# Patient Record
Sex: MALE | Race: WHITE | Employment: FULL TIME | URBAN - METROPOLITAN AREA
[De-identification: names, ages, dates, MRNs, and addresses within clinical notes are randomized per-mention and may not be internally consistent; named-entity substitution may affect disease eponyms.]

---

## 2019-06-21 ENCOUNTER — ANESTHESIA EVENT (OUTPATIENT)
Dept: ENDOSCOPY | Age: 54
End: 2019-06-21
Payer: COMMERCIAL

## 2019-06-24 ENCOUNTER — HOSPITAL ENCOUNTER (OUTPATIENT)
Age: 54
Setting detail: OUTPATIENT SURGERY
Discharge: HOME | End: 2019-06-24
Attending: SURGERY | Admitting: SURGERY
Payer: COMMERCIAL

## 2019-06-24 ENCOUNTER — ANESTHESIA (OUTPATIENT)
Dept: ENDOSCOPY | Age: 54
End: 2019-06-24
Payer: COMMERCIAL

## 2019-06-24 VITALS
HEIGHT: 70 IN | HEART RATE: 72 BPM | DIASTOLIC BLOOD PRESSURE: 90 MMHG | WEIGHT: 210 LBS | BODY MASS INDEX: 30.06 KG/M2 | RESPIRATION RATE: 16 BRPM | OXYGEN SATURATION: 98 % | SYSTOLIC BLOOD PRESSURE: 158 MMHG | TEMPERATURE: 98.6 F

## 2019-06-24 PROCEDURE — 74011250636 HC RX REV CODE- 250/636

## 2019-06-24 PROCEDURE — 74011250636 HC RX REV CODE- 250/636: Performed by: ANESTHESIOLOGY

## 2019-06-24 PROCEDURE — 76040000025: Performed by: SURGERY

## 2019-06-24 PROCEDURE — 76060000032 HC ANESTHESIA 0.5 TO 1 HR: Performed by: SURGERY

## 2019-06-24 RX ORDER — PROPOFOL 10 MG/ML
INJECTION, EMULSION INTRAVENOUS AS NEEDED
Status: DISCONTINUED | OUTPATIENT
Start: 2019-06-24 | End: 2019-06-24 | Stop reason: HOSPADM

## 2019-06-24 RX ORDER — PROPOFOL 10 MG/ML
INJECTION, EMULSION INTRAVENOUS
Status: DISCONTINUED | OUTPATIENT
Start: 2019-06-24 | End: 2019-06-24 | Stop reason: HOSPADM

## 2019-06-24 RX ORDER — SODIUM CHLORIDE, SODIUM LACTATE, POTASSIUM CHLORIDE, CALCIUM CHLORIDE 600; 310; 30; 20 MG/100ML; MG/100ML; MG/100ML; MG/100ML
100 INJECTION, SOLUTION INTRAVENOUS CONTINUOUS
Status: DISCONTINUED | OUTPATIENT
Start: 2019-06-24 | End: 2019-06-24 | Stop reason: HOSPADM

## 2019-06-24 RX ORDER — LIDOCAINE HYDROCHLORIDE 20 MG/ML
INJECTION, SOLUTION EPIDURAL; INFILTRATION; INTRACAUDAL; PERINEURAL AS NEEDED
Status: DISCONTINUED | OUTPATIENT
Start: 2019-06-24 | End: 2019-06-24 | Stop reason: HOSPADM

## 2019-06-24 RX ADMIN — PROPOFOL 300 MCG/KG/MIN: 10 INJECTION, EMULSION INTRAVENOUS at 08:45

## 2019-06-24 RX ADMIN — LIDOCAINE HYDROCHLORIDE 60 MG: 20 INJECTION, SOLUTION EPIDURAL; INFILTRATION; INTRACAUDAL; PERINEURAL at 08:44

## 2019-06-24 RX ADMIN — PROPOFOL 100 MG: 10 INJECTION, EMULSION INTRAVENOUS at 08:47

## 2019-06-24 RX ADMIN — SODIUM CHLORIDE, SODIUM LACTATE, POTASSIUM CHLORIDE, AND CALCIUM CHLORIDE: 600; 310; 30; 20 INJECTION, SOLUTION INTRAVENOUS at 08:37

## 2019-06-24 RX ADMIN — SODIUM CHLORIDE, SODIUM LACTATE, POTASSIUM CHLORIDE, AND CALCIUM CHLORIDE 100 ML/HR: 600; 310; 30; 20 INJECTION, SOLUTION INTRAVENOUS at 08:04

## 2019-06-24 RX ADMIN — PROPOFOL 100 MG: 10 INJECTION, EMULSION INTRAVENOUS at 08:44

## 2019-06-24 NOTE — ANESTHESIA PREPROCEDURE EVALUATION
Relevant Problems   No relevant active problems       Anesthetic History   No history of anesthetic complications            Review of Systems / Medical History  Patient summary reviewed and pertinent labs reviewed    Pulmonary  Within defined limits                 Neuro/Psych   Within defined limits           Cardiovascular    Hypertension: well controlled              Exercise tolerance: >4 METS     GI/Hepatic/Renal  Within defined limits              Endo/Other  Within defined limits           Other Findings              Physical Exam    Airway  Mallampati: II  TM Distance: 4 - 6 cm  Neck ROM: normal range of motion   Mouth opening: Normal     Cardiovascular  Regular rate and rhythm,  S1 and S2 normal,  no murmur, click, rub, or gallop             Dental         Pulmonary  Breath sounds clear to auscultation               Abdominal         Other Findings            Anesthetic Plan    ASA: 2  Anesthesia type: total IV anesthesia          Induction: Intravenous  Anesthetic plan and risks discussed with: Patient and Spouse

## 2019-06-24 NOTE — PROCEDURES
Procedure in Detail:  Informed consent was obtained for the procedure. The patient was placed in the left lateral decubitus position and sedation was induced by anesthesia. The scope was inserted into the rectum and advanced under direct vision to the cecum, which was identified by the appendiceal orifice. The quality of the colonic preparation was adequate with a small to moderate amount of thick sludge in the right colon. A careful inspection was made as the colonoscope was withdrawn, including a retroflexed view of the rectum; findings and interventions are described below. Appropriate photodocumentation was obtained. Findings:   ANUS: Anal exam reveals no masses or hemorrhoids, sphincter tone is normal.   RECTUM: Rectal exam reveals no masses or hemorrhoids. SIGMOID COLON: The mucosa is normal with good vascular pattern and without ulcers or polyps. A few shallow diverticula were noted. DESCENDING COLON: The mucosa is normal with good vascular pattern and without ulcers, diverticula, and polyps. SPLENIC FLEXURE: The splenic flexure is normal.   TRANSVERSE COLON: The mucosa is normal with good vascular pattern and without ulcers, diverticula, and polyps. HEPATIC FLEXURE: The hepatic flexure is normal.   ASCENDING COLON: The mucosa is normal with good vascular pattern and without ulcers, diverticula, and polyps. CECUM: The appendiceal orifice appears normal. The ileocecal valve appears normal.   TERMINAL ILEUM: The terminal ileum was not entered. Specimens: No specimens were collected. Complications: None; patient tolerated the procedure well. \    EBL - none    Recommendations:   - For colon cancer screening in this average-risk patient, colonoscopy may be repeated in 10 years.      Signed By: Sunshien Patricia MD                        June 24, 2019

## 2019-06-24 NOTE — DISCHARGE INSTRUCTIONS
Kosta Valdes M.D.  [phone]    Instructions following colonoscopy:    ACTIVITY:   Resume usual, basic activities around the house today.  You may be light-headed or sleepy from anesthesia, so be careful going up and down stairs.  Avoid driving, operating machinery, or signing documents for 24 hours. DIET:   No restriction. Please note, some people may have nausea or cramps after this procedure which can result in an upset stomach after eating.  Many people have loose stools or diarrhea immediately after colonoscopy. It is also not uncommon to not have a bowel movement for 2-3 days. PAIN:   Some cramping or gas pain is normal after colonoscopy. However, if you experience worsening pain over the course of the day, or pain with associated fever please call the office immediately      [address] IF:   You have a temperature higher than 101.5° Fahrenheit for more than 6 hours.  You have severe nausea or vomiting not relieved by medication; or diarrhea. If you take a blood thinning medicine resume it:    Otherwise, continue home medications as previously prescribed.

## 2019-06-24 NOTE — H&P
History and Physical      Patient: Tamia Koo    Physician: Nando Lynch MD    Referring Physician: Julia Couch MD    Chief Complaint: For colonoscopy    History of Present Illness: Pt presents for colonoscopy. Initial screening. History:  Past Medical History:   Diagnosis Date    ADD (attention deficit disorder)     H/O degenerative disc disease     Hypertension     Mixed hyperlipidemia  LOW RISK 1/6/2016     Past Surgical History:   Procedure Laterality Date    HX BACK SURGERY      back surgery    HX ORTHOPAEDIC      knee surgery       Social History     Socioeconomic History    Marital status:      Spouse name: Not on file    Number of children: Not on file    Years of education: Not on file    Highest education level: Not on file   Tobacco Use    Smoking status: Never Smoker    Smokeless tobacco: Current User    Tobacco comment: snuff   Substance and Sexual Activity    Alcohol use: Yes     Comment: occasional    Drug use: No    Sexual activity: Yes     Partners: Female      Family History   Problem Relation Age of Onset    Heart Disease Mother     Hypertension Mother     Cancer Father         LUNG    Hypertension Paternal Uncle     Cancer Maternal Grandmother     Stroke Maternal Grandmother     Cancer Maternal Grandfather         LUNG    Attention Deficit Disorder Daughter     Depression Daughter        Medications:   Prior to Admission medications    Medication Sig Start Date End Date Taking? Authorizing Provider   predniSONE (DELTASONE) 10 mg tablet Take 1 Tab by mouth daily (with breakfast). 10/11/15   Louise Lee,    oxyCODONE-acetaminophen (PERCOCET) 5-325 mg per tablet Take 1 Tab by mouth every four (4) hours as needed for Pain. Max Daily Amount: 6 Tabs. 10/11/15   Frank Grace DO   lisinopril (PRINIVIL, ZESTRIL) 5 mg tablet Take  by mouth daily.  Indications: HYPERTENSION    Provider, Historical   cyclobenzaprine (FLEXERIL) 10 mg tablet Take 1 Tab by mouth three (3) times daily as needed for Muscle Spasm(s). 10/8/15   Yg Blue, DO   HYDROcodone-acetaminophen (NORCO) 5-325 mg per tablet Take 1 Tab by mouth every four (4) hours as needed for Pain for up to 12 doses. Max Daily Amount: 6 Tabs. 10/8/15   Yg Blue, DO       Allergies: No Known Allergies    Physical Exam:     Vital Signs:   Visit Vitals  BP (!) 192/94   Pulse 73   Temp 98.2 °F (36.8 °C)   Resp 18   Ht 5' 10\" (1.778 m)   Wt 210 lb (95.3 kg)   SpO2 99%   BMI 30.13 kg/m²     . General: no distress      Heart: regular   Lungs: unlabored   Abdominal: soft   Neurological: Grossly normal        Findings/Diagnosis: Screening for colorectal cancer     Plan of Care/Planned Procedure: Colonoscopy, possible polypectomy. Pt/designee has reviewed the colonoscopy information sheet. Any questions have been discussed. They agree to proceed.       Signed:  Karlee Garcia MD   6/24/2019

## 2019-06-24 NOTE — ROUTINE PROCESS
Pt. Discharged to car by Fili Grant with wife. Vital signs stable. Able to tolerate PO fluids. Passing gas.  Seen by MD.

## 2019-06-24 NOTE — ANESTHESIA POSTPROCEDURE EVALUATION
Procedure(s):  COLONOSCOPY / BMI 32.    total IV anesthesia    Anesthesia Post Evaluation      Multimodal analgesia: multimodal analgesia used between 6 hours prior to anesthesia start to PACU discharge  Patient location during evaluation: bedside  Patient participation: complete - patient participated  Level of consciousness: awake and alert  Pain score: 3  Pain management: adequate  Airway patency: patent  Anesthetic complications: no  Cardiovascular status: acceptable and hemodynamically stable  Respiratory status: acceptable  Hydration status: acceptable  Post anesthesia nausea and vomiting:  none      Vitals Value Taken Time   /75 6/24/2019  9:24 AM   Temp 37 °C (98.6 °F) 6/24/2019  9:18 AM   Pulse 80 6/24/2019  9:33 AM   Resp 16 6/24/2019  9:24 AM   SpO2 99 % 6/24/2019  9:33 AM   Vitals shown include unvalidated device data.

## 2019-09-09 ENCOUNTER — HOSPITAL ENCOUNTER (OUTPATIENT)
Dept: OCCUPATIONAL MEDICINE | Age: 54
Discharge: HOME | End: 2019-09-09

## 2019-09-09 DIAGNOSIS — T14.90XA INJURY: ICD-10-CM

## 2024-06-18 ENCOUNTER — HOSPITAL ENCOUNTER (EMERGENCY)
Age: 59
Discharge: HOME | End: 2024-06-18
Attending: STUDENT IN AN ORGANIZED HEALTH CARE EDUCATION/TRAINING PROGRAM
Payer: COMMERCIAL

## 2024-06-18 VITALS
HEIGHT: 70 IN | DIASTOLIC BLOOD PRESSURE: 64 MMHG | OXYGEN SATURATION: 100 % | TEMPERATURE: 97.9 F | WEIGHT: 240 LBS | BODY MASS INDEX: 34.36 KG/M2 | SYSTOLIC BLOOD PRESSURE: 165 MMHG | HEART RATE: 71 BPM | RESPIRATION RATE: 15 BRPM

## 2024-06-18 DIAGNOSIS — M54.50 ACUTE EXACERBATION OF CHRONIC LOW BACK PAIN: Primary | ICD-10-CM

## 2024-06-18 DIAGNOSIS — G89.29 ACUTE EXACERBATION OF CHRONIC LOW BACK PAIN: Primary | ICD-10-CM

## 2024-06-18 PROCEDURE — 99284 EMERGENCY DEPT VISIT MOD MDM: CPT

## 2024-06-18 PROCEDURE — 96372 THER/PROPH/DIAG INJ SC/IM: CPT

## 2024-06-18 PROCEDURE — 6360000002 HC RX W HCPCS: Performed by: STUDENT IN AN ORGANIZED HEALTH CARE EDUCATION/TRAINING PROGRAM

## 2024-06-18 PROCEDURE — 6370000000 HC RX 637 (ALT 250 FOR IP): Performed by: STUDENT IN AN ORGANIZED HEALTH CARE EDUCATION/TRAINING PROGRAM

## 2024-06-18 RX ORDER — PREDNISONE 20 MG/1
40 TABLET ORAL ONCE
Status: COMPLETED | OUTPATIENT
Start: 2024-06-18 | End: 2024-06-18

## 2024-06-18 RX ORDER — KETOROLAC TROMETHAMINE 15 MG/ML
15 INJECTION, SOLUTION INTRAMUSCULAR; INTRAVENOUS ONCE
Status: COMPLETED | OUTPATIENT
Start: 2024-06-18 | End: 2024-06-18

## 2024-06-18 RX ORDER — LIDOCAINE 4 G/G
1 PATCH TOPICAL ONCE
Status: DISCONTINUED | OUTPATIENT
Start: 2024-06-18 | End: 2024-06-18 | Stop reason: HOSPADM

## 2024-06-18 RX ORDER — LIDOCAINE 50 MG/G
1 PATCH TOPICAL DAILY
Qty: 10 PATCH | Refills: 0 | Status: SHIPPED | OUTPATIENT
Start: 2024-06-18 | End: 2024-06-28

## 2024-06-18 RX ORDER — METHYLPREDNISOLONE 4 MG/1
TABLET ORAL
Qty: 1 KIT | Refills: 0 | Status: SHIPPED | OUTPATIENT
Start: 2024-06-18 | End: 2024-06-24

## 2024-06-18 RX ORDER — METHOCARBAMOL 750 MG/1
750 TABLET, FILM COATED ORAL 3 TIMES DAILY
Qty: 21 TABLET | Refills: 0 | Status: SHIPPED | OUTPATIENT
Start: 2024-06-18 | End: 2024-06-25

## 2024-06-18 RX ORDER — MELOXICAM 15 MG/1
15 TABLET ORAL DAILY
Qty: 30 TABLET | Refills: 0 | Status: SHIPPED | OUTPATIENT
Start: 2024-06-18

## 2024-06-18 RX ORDER — CYCLOBENZAPRINE HCL 10 MG
10 TABLET ORAL
Status: COMPLETED | OUTPATIENT
Start: 2024-06-18 | End: 2024-06-18

## 2024-06-18 RX ADMIN — PREDNISONE 40 MG: 20 TABLET ORAL at 06:24

## 2024-06-18 RX ADMIN — CYCLOBENZAPRINE 10 MG: 10 TABLET, FILM COATED ORAL at 06:15

## 2024-06-18 RX ADMIN — KETOROLAC TROMETHAMINE 15 MG: 15 INJECTION, SOLUTION INTRAMUSCULAR; INTRAVENOUS at 06:15

## 2024-06-18 ASSESSMENT — PAIN - FUNCTIONAL ASSESSMENT: PAIN_FUNCTIONAL_ASSESSMENT: 0-10

## 2024-06-18 ASSESSMENT — PAIN SCALES - GENERAL
PAINLEVEL_OUTOF10: 8
PAINLEVEL_OUTOF10: 8

## 2024-06-18 NOTE — ED NOTES
Patient mobility status  with no difficulty. Provider aware     I have reviewed discharge instructions with the patient and spouse.  The patient and spouse verbalized understanding.    Patient left ED via Discharge Method: ambulatory to Home with Significant Other.    Opportunity for questions and clarification provided.     Patient given 4 scripts.

## 2024-06-18 NOTE — DISCHARGE INSTRUCTIONS
Apply alternating ice and heat to the low back for 20 minutes at a time every few hours. You may take Tylenol in addition to the prescribed medication. Do not drive while taking the muscle relaxer as it can make you sleepy. Please follow up with the Spine Surgery clinic in the next few weeks. Give their office a call to set up an appointment. Return to the ER if any new or worsening symptoms or if you develop any numbness in your groin or any issues controlling your bowel or bladders.

## 2024-06-18 NOTE — ED PROVIDER NOTES
[facility]  Emergency Department    DISPOSITION Decision To Discharge 06/18/2024 05:48:49 AM       ICD-10-CM    1. Acute exacerbation of chronic low back pain  M54.50 [facility] (Spine Surgery)    G89.29         ED Course     ED Course as of 06/18/24 0639   Tue Jun 18, 2024   0547 59-year-old male history of HTN, degenerative disc disease presents with acute on chronic pain to low back.  No trauma.  Hypertensive (suspect pain related) otherwise vitals reassuring.  No red flag symptoms.  Pain is reproducible on exam.  Suspect likely MSK strain.  Low concern for cauda equina or spinal epidural abscess.  No indication for emergent imaging at this time will start on symptomatic management.  Given referral to spine surgery.  Return precautions given [ER]      ED Course User Index  [ER] Marcus Escobar MD     Data Reviewed and Analyzed:  1 chronic illness with exacerbation.  Prescription drug management performed.    I independently ordered and reviewed each unique test.        HPI   Og García is a 59 y.o. male with a history of HTN, degenerative disc disease who presents to the ED with complaint of back pain.  Reports a multiweek history of persistent progressively worsening pain in his low back.  States over the past few weeks has had increase of symptoms including sharp pain in midline of back that radiates to both buttock regions.  No radiation down legs.  Denies numbness, tingling, weakness, saddle anesthesia, urinary/bowel incontinence, urinary retention, fevers.  No injury or trauma.  Pain is made worse with rotation of trunk and with lateral movement.  Has had similar in the past when he was diagnosed with degenerative disc disease.  Has undergone steroid injections in the past which she states has not helped.  He has attempted physical therapy in the past as well which is also not helped.  Is requesting referral for spine surgery.  Denies

## 2024-07-08 ENCOUNTER — OFFICE VISIT (OUTPATIENT)
Age: 59
End: 2024-07-08
Payer: COMMERCIAL

## 2024-07-08 VITALS — HEIGHT: 70 IN | WEIGHT: 234.4 LBS | BODY MASS INDEX: 33.56 KG/M2

## 2024-07-08 DIAGNOSIS — M51.36 LUMBAR DEGENERATIVE DISC DISEASE: ICD-10-CM

## 2024-07-08 DIAGNOSIS — M47.816 LUMBAR FACET ARTHROPATHY: ICD-10-CM

## 2024-07-08 DIAGNOSIS — M54.16 LUMBAR RADICULOPATHY: ICD-10-CM

## 2024-07-08 DIAGNOSIS — M54.9 BACK PAIN, UNSPECIFIED BACK LOCATION, UNSPECIFIED BACK PAIN LATERALITY, UNSPECIFIED CHRONICITY: Primary | ICD-10-CM

## 2024-07-08 PROCEDURE — 99204 OFFICE O/P NEW MOD 45 MIN: CPT | Performed by: NURSE PRACTITIONER

## 2024-07-08 RX ORDER — TIZANIDINE 4 MG/1
4 TABLET ORAL 3 TIMES DAILY PRN
Qty: 30 TABLET | Refills: 0 | Status: SHIPPED | OUTPATIENT
Start: 2024-07-08

## 2024-07-08 RX ORDER — LIDOCAINE 50 MG/G
1 PATCH TOPICAL DAILY
Qty: 10 PATCH | Refills: 0 | Status: SHIPPED | OUTPATIENT
Start: 2024-07-08 | End: 2024-07-18

## 2024-07-08 RX ORDER — CELECOXIB 200 MG/1
200 CAPSULE ORAL 2 TIMES DAILY PRN
Qty: 60 CAPSULE | Refills: 0 | Status: SHIPPED | OUTPATIENT
Start: 2024-07-08

## 2024-07-08 NOTE — PROGRESS NOTES
Name: Og García  YOB: 1965  Gender: male  MRN: [id number]    CC: Right lower back pain, intermittent right leg paresthesia    HPI: This is a 59 y.o. year old male who has a history of a back surgery over 20+ years ago.  This was at L5-S1.  He presented after being treated 2 weeks ago in the emergency room for increased lower back pain primarily in the right.  No recent falls.  He was treated with steroids, meloxicam, Robaxin, Flexeril.  Unfortunately has not had much relief.  He has been doing home exercises via the emergency room for the past 2 weeks.  He denies bladder or bowel changes.  When he does get tingling down his leg gets in the posterior lateral distribution to his ankle.      This patient  has had lumbar surgery in the past.     Thus far, the patient has tried : Meloxicam, Advil, Tylenol, lidocaine patches, Robaxin, Flexeril, HEP x 2 weeks  Current pain level: 7  Activities limited by pain: Most activities           7/8/2024     9:02 AM   AMB PAIN ASSESSMENT   Location of Pain Back   Location Modifiers Left;Medial   Severity of Pain 7   Quality of Pain Throbbing;Sharp   Duration of Pain Persistent   Frequency of Pain Constant   Aggravating Factors Walking;Standing;Bending   Limiting Behavior Yes   Result of Injury No   Work-Related Injury No   Are there other pain locations you wish to document? No            ROS/Meds/PSH/PMH/FH/SH: I personally reviewed the patient's collected intake data.  Below are the pertinents:    No Known Allergies      Current Outpatient Medications:     celecoxib (CELEBREX) 200 MG capsule, Take 1 capsule by mouth 2 times daily as needed for Pain, Disp: 60 capsule, Rfl: 0    tiZANidine (ZANAFLEX) 4 MG tablet, Take 1 tablet by mouth 3 times daily as needed (muscle spasm), Disp: 30 tablet, Rfl: 0    lidocaine (LIDODERM) 5 %, Place 1 patch onto the skin daily for 10 days 12 hours on, 12 hours off., Disp: 10 patch, Rfl: 0    lisinopril (PRINIVIL;ZESTRIL)

## 2024-08-05 ENCOUNTER — OFFICE VISIT (OUTPATIENT)
Age: 59
End: 2024-08-05
Payer: COMMERCIAL

## 2024-08-05 DIAGNOSIS — M47.816 LUMBAR FACET ARTHROPATHY: Primary | ICD-10-CM

## 2024-08-05 DIAGNOSIS — M51.36 LUMBAR DEGENERATIVE DISC DISEASE: ICD-10-CM

## 2024-08-05 DIAGNOSIS — M54.16 LUMBAR RADICULOPATHY: ICD-10-CM

## 2024-08-05 PROCEDURE — 99213 OFFICE O/P EST LOW 20 MIN: CPT | Performed by: NURSE PRACTITIONER

## 2024-08-05 NOTE — PROGRESS NOTES
Name: Og García  YOB: 1965  Gender: male  MRN: [id number]    CC: Follow-up right lower back pain, intermittent right leg numbness    HPI: This is a 59 y.o. year old male who  has had history of back surgery over 20 years ago.  This was at L5-S1.  He presented to me from the emergency room with increased lower back pain primarily on the right.  No recent trauma or falls.  He was treated with steroids, meloxicam, Robaxin, Flexeril, Celebrex and tizanidine.  Patient has completed a home exercise program under my care in the care of the emergency room for 6 weeks.  He unfortunately has not really had much improvement.  He has had some tingling that goes down his right leg in the posterior lateral distribution to his ankle.  X-rays reveal degenerative disc disease at L5-S1      Thus far, the patient has tried tylenol, NSAIDS, muscle relaxers, oral steroids, and physician directed home exercise program.    Patient has completed a home exercise program under my care for 6 weeks, from 6/18/24 to 8/5/24  performing exercises 5-6 times a week.These exercises included: Pelvic tilt, cat and camel, single knee-to-chest, double knee-to-chest, lower trunk rotation, pelvic bridging, prone on elbows, prone on extended arms, standing back extensions, side bend, prone upper extremity exercise, prone lower extremity exercise.        Current pain level: 7/10  Activities limited by pain: Most activities    Patient does states that he has some metal in his upper left forearm due to chopping wood injury.  He has had previous MRIs though.             7/8/2024     9:02 AM   AMB PAIN ASSESSMENT   Location of Pain Back   Location Modifiers Left;Medial   Severity of Pain 7   Quality of Pain Throbbing;Sharp   Duration of Pain Persistent   Frequency of Pain Constant   Aggravating Factors Walking;Standing;Bending   Limiting Behavior Yes   Result of Injury No   Work-Related Injury No   Are there other pain locations you

## 2024-08-13 ENCOUNTER — APPOINTMENT (OUTPATIENT)
Dept: GENERAL RADIOLOGY | Age: 59
End: 2024-08-13
Payer: COMMERCIAL

## 2024-08-13 ENCOUNTER — HOSPITAL ENCOUNTER (EMERGENCY)
Age: 59
Discharge: HOME | End: 2024-08-13
Attending: EMERGENCY MEDICINE
Payer: COMMERCIAL

## 2024-08-13 VITALS
BODY MASS INDEX: 33.64 KG/M2 | DIASTOLIC BLOOD PRESSURE: 75 MMHG | TEMPERATURE: 98.8 F | HEART RATE: 70 BPM | OXYGEN SATURATION: 98 % | WEIGHT: 235 LBS | RESPIRATION RATE: 20 BRPM | HEIGHT: 70 IN | SYSTOLIC BLOOD PRESSURE: 166 MMHG

## 2024-08-13 DIAGNOSIS — M79.671 RIGHT FOOT PAIN: Primary | ICD-10-CM

## 2024-08-13 DIAGNOSIS — M77.31 CALCANEAL SPUR OF RIGHT FOOT: ICD-10-CM

## 2024-08-13 PROCEDURE — 73630 X-RAY EXAM OF FOOT: CPT

## 2024-08-13 PROCEDURE — 99283 EMERGENCY DEPT VISIT LOW MDM: CPT

## 2024-08-13 PROCEDURE — 6370000000 HC RX 637 (ALT 250 FOR IP): Performed by: EMERGENCY MEDICINE

## 2024-08-13 RX ORDER — PREDNISONE 20 MG/1
40 TABLET ORAL DAILY
Qty: 10 TABLET | Refills: 0 | Status: SHIPPED | OUTPATIENT
Start: 2024-08-13 | End: 2024-08-18

## 2024-08-13 RX ORDER — PREDNISONE 20 MG/1
40 TABLET ORAL ONCE
Status: COMPLETED | OUTPATIENT
Start: 2024-08-13 | End: 2024-08-13

## 2024-08-13 RX ADMIN — PREDNISONE 40 MG: 20 TABLET ORAL at 05:49

## 2024-08-13 ASSESSMENT — PAIN - FUNCTIONAL ASSESSMENT: PAIN_FUNCTIONAL_ASSESSMENT: 0-10

## 2024-08-13 ASSESSMENT — PAIN DESCRIPTION - FREQUENCY: FREQUENCY: CONTINUOUS

## 2024-08-13 ASSESSMENT — PAIN DESCRIPTION - ORIENTATION: ORIENTATION: RIGHT

## 2024-08-13 ASSESSMENT — PAIN DESCRIPTION - PAIN TYPE: TYPE: ACUTE PAIN

## 2024-08-13 ASSESSMENT — PAIN SCALES - GENERAL: PAINLEVEL_OUTOF10: 8

## 2024-08-13 ASSESSMENT — PAIN DESCRIPTION - LOCATION: LOCATION: FOOT

## 2024-08-13 NOTE — ED PROVIDER NOTES
Emergency Department Provider Note       PCP: Og Christina MD   Age: 59 y.o.   Sex: male     DISPOSITION Decision To Discharge 08/13/2024 05:42:38 AM       ICD-10-CM    1. Right foot pain  M79.671       2. Calcaneal spur of right foot  M77.31           Medical Decision Making     Possible early gout.  No occult fracture appreciated.  Will prescribe prednisone.  Given return precautions.     1 acute, uncomplicated illness or injury.  Prescription drug management performed.  Shared medical decision making was utilized in creating the patients health plan today.    I independently ordered and reviewed each unique test.       I interpreted the X-rays calcaneal spur, no fracture or other acute abnormality.              History     59-year-old male with history of gout presents with right foot pain that has been currently worsening over the past 3 days.  He denies any fever, redness, swelling, injury, new activity, exercise, jumping, or running.        Physical Exam     Vitals signs and nursing note reviewed:  Vitals:    08/13/24 0428   BP: (!) 166/75   Pulse: 70   Resp: 20   Temp: 98.8 °F (37.1 °C)   TempSrc: Oral   SpO2: 98%   Weight: 106.6 kg (235 lb)   Height: 1.778 m (5' 10\")      Physical Exam  Vitals and nursing note reviewed.   Constitutional:       Appearance: Normal appearance.   HENT:      Head: Normocephalic and atraumatic.      Nose: Nose normal.      Mouth/Throat:      Mouth: Mucous membranes are moist.   Eyes:      Extraocular Movements: Extraocular movements intact.      Pupils: Pupils are equal, round, and reactive to light.   Cardiovascular:      Rate and Rhythm: Normal rate and regular rhythm.   Pulmonary:      Effort: Pulmonary effort is normal. No respiratory distress.   Abdominal:      General: Abdomen is flat. There is no distension.   Musculoskeletal:         General: No deformity. Normal range of motion.      Cervical back: Normal range of motion and neck supple.      Right foot: Normal

## 2024-10-18 ENCOUNTER — OFFICE VISIT (OUTPATIENT)
Dept: ORTHOPEDIC SURGERY | Age: 59
End: 2024-10-18

## 2024-10-18 DIAGNOSIS — M19.071 PRIMARY OSTEOARTHRITIS OF RIGHT FOOT: ICD-10-CM

## 2024-10-18 DIAGNOSIS — M79.2 FOOT NEURALGIA: ICD-10-CM

## 2024-10-18 DIAGNOSIS — M79.671 RIGHT FOOT PAIN: Primary | ICD-10-CM

## 2024-10-18 NOTE — PROGRESS NOTES
Name: Og García  YOB: 1965  Gender: male  MRN: [id number]    CC: Right foot pain    HPI:   August 2024: Right foot pain: No trauma  10/18/2024: Initial visit: Right foot pain    ROS/Meds/PSH/PMH/FH/SH: only reviewed pertinent/relevant information today    Tobacco:  reports that he has never smoked. He uses smokeless tobacco.     Physical Examination:  Patient appears to be alert and oriented with acceptable appearance.  No obvious distress or SOB  CV: LE acceptable vascular flow, color and capillary refill  Neuro: LE mostly intact light touch sensation   Skin: Right = thin fat pad  MS: Standing: Pes planus: Gait full  Right = 1st branch lateral plantar nerve/plantar fascia pain  Right = medial to plantar medial naviculocuneiform foot pain   Right = no evidence of ATT involvement, deficiency or tendinosis  Right = no evidence of PTT involvement, deficiency or tendinosis  Right = slight limited hindfoot mobility    XR: Right: Standing AP lateral mortise ankle plus AP oblique foot taken today with calcaneonavicular coalition arthritis; naviculocuneiform, tarsometatarsal arthritis; naviculocuneiform sagging; os trigonum; plantar and posterior heel enthesopathy; big toe MTP arthritis  XR Impression:  As above      Reviewed Test/Records/Documents:   08/05/2024: Ms. Dar Ferreira: NP: Lumbar facet arthropathy: Lumbar degenerative disc disease: Lumbar radiculopathy: Mainly right sided concerns: Prior treatment: Steroids: Meloxicam: Robaxin: Flexeril: Celebrex: Tizanidine  08/13/2024: [facility] ED: Right foot pain increased 3 days prior: Suspected early gout: Prednisone prescribed:  08/13/2024: [facility] ED: Right foot x-ray: Radiology impression: No acute findings: There is a plantar calcaneal bone spur  My review of [facility] ED foot NWB films correlate with weightbearing x-rays taken 10/18/2024  10/16/2024: [facility]: Dr. Christina: HTN: Hypoventilation syndrome: Chronic gout right wrist:     Injection:

## 2024-10-18 NOTE — PROGRESS NOTES
The patient was prescribed and fitted with an Aircast Airheel for the right foot, size medium.     Patient read and signed documenting they understand and agree to [facility]'s current DME return policy.

## 2024-11-04 ENCOUNTER — TELEPHONE (OUTPATIENT)
Dept: ORTHOPEDIC SURGERY | Age: 59
End: 2024-11-04

## 2024-11-04 NOTE — TELEPHONE ENCOUNTER
LM for pt to call the office back. Wanted to ask how pt was feeling and ask if they would like to reschedule their cancelled apt.

## 2025-01-07 ENCOUNTER — HOSPITAL ENCOUNTER (EMERGENCY)
Age: 60
Discharge: HOME | End: 2025-01-07
Attending: EMERGENCY MEDICINE
Payer: COMMERCIAL

## 2025-01-07 VITALS
OXYGEN SATURATION: 99 % | HEART RATE: 79 BPM | DIASTOLIC BLOOD PRESSURE: 76 MMHG | TEMPERATURE: 97.7 F | WEIGHT: 240 LBS | BODY MASS INDEX: 34.36 KG/M2 | HEIGHT: 70 IN | RESPIRATION RATE: 18 BRPM | SYSTOLIC BLOOD PRESSURE: 168 MMHG

## 2025-01-07 DIAGNOSIS — B34.9 VIRAL ILLNESS: Primary | ICD-10-CM

## 2025-01-07 LAB
FLUAV RNA SPEC QL NAA+PROBE: NOT DETECTED
FLUBV RNA SPEC QL NAA+PROBE: NOT DETECTED
SARS-COV-2 RDRP RESP QL NAA+PROBE: NOT DETECTED
SOURCE: NORMAL

## 2025-01-07 PROCEDURE — 87635 SARS-COV-2 COVID-19 AMP PRB: CPT

## 2025-01-07 PROCEDURE — 6370000000 HC RX 637 (ALT 250 FOR IP): Performed by: EMERGENCY MEDICINE

## 2025-01-07 PROCEDURE — 87502 INFLUENZA DNA AMP PROBE: CPT

## 2025-01-07 PROCEDURE — 99283 EMERGENCY DEPT VISIT LOW MDM: CPT

## 2025-01-07 RX ORDER — OXYCODONE HYDROCHLORIDE 5 MG/1
5 TABLET ORAL
Status: COMPLETED | OUTPATIENT
Start: 2025-01-07 | End: 2025-01-07

## 2025-01-07 RX ORDER — BENZONATATE 100 MG/1
200 CAPSULE ORAL 3 TIMES DAILY PRN
Qty: 30 CAPSULE | Refills: 0 | Status: SHIPPED | OUTPATIENT
Start: 2025-01-07 | End: 2025-01-12

## 2025-01-07 RX ADMIN — OXYCODONE 5 MG: 5 TABLET ORAL at 05:34

## 2025-01-07 ASSESSMENT — ENCOUNTER SYMPTOMS
COUGH: 1
NAUSEA: 0
VOMITING: 0
DIARRHEA: 0
RHINORRHEA: 1

## 2025-01-07 ASSESSMENT — PAIN DESCRIPTION - LOCATION: LOCATION: GENERALIZED;HEAD

## 2025-01-07 ASSESSMENT — PAIN - FUNCTIONAL ASSESSMENT: PAIN_FUNCTIONAL_ASSESSMENT: 0-10

## 2025-01-07 ASSESSMENT — PAIN SCALES - GENERAL: PAINLEVEL_OUTOF10: 7

## 2025-01-07 NOTE — ED PROVIDER NOTES
Emergency Department Provider Note       PCP: Og Christina MD   Age: 59 y.o.   Sex: male     DISPOSITION                No diagnosis found.    Medical Decision Making     I will check a flu and COVID swab.  His exam is otherwise unremarkable.  I will give him some oxycodone for the headache as he says he took some Tylenol Cold and flu about an hour prior to arrival  ED Course as of 01/07/25 0630   Tue Jan 07, 2025   0629 Flu and COVID tests are negative.  I will discharge him home with a prescription cough medicine. [AC]      ED Course User Index  [AC] Casper Ordaz MD     1 acute, uncomplicated illness or injury.  Prescription drug management performed.  Shared medical decision making was utilized in creating the patients health plan today.    I independently ordered and reviewed each unique test.                     History     59-year-old gentleman presents with concerns about not feeling well since Sunday.  He says he has some bodyaches congestion and headaches and cough.  He denies any specific fevers.  He had no specific vomiting or diarrhea.    He says he has a history of high blood pressure and takes his blood pressure medicines.  He dips but says he does not smoke, drink, or do drugs.    No other associated symptoms.    Elements of this note were created using speech recognition software.  As such, errors of speech recognition may be present.        ROS     Review of Systems   Constitutional:  Negative for chills and fever.   HENT:  Positive for congestion and rhinorrhea.    Respiratory:  Positive for cough.    Gastrointestinal:  Negative for diarrhea, nausea and vomiting.   Neurological:  Positive for headaches. Negative for dizziness and light-headedness.        Physical Exam     Vitals signs and nursing note reviewed:  Vitals:    01/07/25 0523   BP: (!) 168/76   Pulse: 79   Resp: 18   Temp: 97.7 °F (36.5 °C)   TempSrc: Oral   SpO2: 99%   Weight: 108.9 kg (240 lb)   Height: 1.778 m (5' 10\")  Detail Level: Detailed Patient Specific Counseling (Will Not Stick From Patient To Patient): \\n\\nDiscussed features of dermatographism and hypersensitivity.  Advised that daily antihistamine will likely provide symptomatic relief.  Also likely a component of folliculitis secondary to frequent shaving.  \\n\\n

## 2025-01-07 NOTE — ED TRIAGE NOTES
C/o HA, sore throat, cough, achy symptoms started on Saturday but no better. Took OTC meds with no relief.

## 2025-01-07 NOTE — ED NOTES
I have reviewed discharge instructions with the patient.  The patient verbalized understanding.    Patient left ED via Discharge Method: ambulatory to Home.    Opportunity for questions and clarification provided.       Patient given 1 scripts.         To continue your aftercare when you leave the hospital, you may receive an automated call from our care team to check in on how you are doing.  This is a free service and part of our promise to provide the best care and service to meet your aftercare needs.” If you have questions, or wish to unsubscribe from this service please call [phone].  Thank you for Choosing our [facility] Emergency Department.

## 2025-01-07 NOTE — DISCHARGE INSTRUCTIONS
Please return with any fevers, vomiting, difficulty breathing, worsening symptoms, or additional concerns.    Follow-up with your primary care doctor for reevaluation in 2 or 3 days.

## 2025-03-04 ENCOUNTER — APPOINTMENT (OUTPATIENT)
Dept: CT IMAGING | Age: 60
End: 2025-03-04
Payer: COMMERCIAL

## 2025-03-04 ENCOUNTER — HOSPITAL ENCOUNTER (EMERGENCY)
Age: 60
Discharge: HOME | End: 2025-03-04
Payer: COMMERCIAL

## 2025-03-04 VITALS
HEART RATE: 70 BPM | HEIGHT: 70 IN | WEIGHT: 240 LBS | OXYGEN SATURATION: 98 % | TEMPERATURE: 98.1 F | BODY MASS INDEX: 34.36 KG/M2 | SYSTOLIC BLOOD PRESSURE: 160 MMHG | RESPIRATION RATE: 16 BRPM | DIASTOLIC BLOOD PRESSURE: 79 MMHG

## 2025-03-04 DIAGNOSIS — M62.838 SPASM OF MUSCLE: ICD-10-CM

## 2025-03-04 DIAGNOSIS — S39.012A STRAIN OF LUMBAR REGION, INITIAL ENCOUNTER: Primary | ICD-10-CM

## 2025-03-04 PROCEDURE — 72131 CT LUMBAR SPINE W/O DYE: CPT

## 2025-03-04 PROCEDURE — 6360000002 HC RX W HCPCS: Performed by: NURSE PRACTITIONER

## 2025-03-04 PROCEDURE — 6370000000 HC RX 637 (ALT 250 FOR IP): Performed by: NURSE PRACTITIONER

## 2025-03-04 PROCEDURE — 96372 THER/PROPH/DIAG INJ SC/IM: CPT

## 2025-03-04 PROCEDURE — 99284 EMERGENCY DEPT VISIT MOD MDM: CPT

## 2025-03-04 RX ORDER — HYDROCODONE BITARTRATE AND ACETAMINOPHEN 5; 325 MG/1; MG/1
1 TABLET ORAL EVERY 6 HOURS PRN
Qty: 10 TABLET | Refills: 0 | Status: SHIPPED | OUTPATIENT
Start: 2025-03-04 | End: 2025-03-07

## 2025-03-04 RX ORDER — KETOROLAC TROMETHAMINE 30 MG/ML
30 INJECTION, SOLUTION INTRAMUSCULAR; INTRAVENOUS ONCE
Status: COMPLETED | OUTPATIENT
Start: 2025-03-04 | End: 2025-03-04

## 2025-03-04 RX ORDER — OXYCODONE AND ACETAMINOPHEN 5; 325 MG/1; MG/1
1 TABLET ORAL
Status: COMPLETED | OUTPATIENT
Start: 2025-03-04 | End: 2025-03-04

## 2025-03-04 RX ORDER — CYCLOBENZAPRINE HCL 10 MG
10 TABLET ORAL
Status: COMPLETED | OUTPATIENT
Start: 2025-03-04 | End: 2025-03-04

## 2025-03-04 RX ORDER — DEXAMETHASONE SODIUM PHOSPHATE 10 MG/ML
8 INJECTION, SOLUTION INTRA-ARTICULAR; INTRALESIONAL; INTRAMUSCULAR; INTRAVENOUS; SOFT TISSUE
Status: COMPLETED | OUTPATIENT
Start: 2025-03-04 | End: 2025-03-04

## 2025-03-04 RX ADMIN — CYCLOBENZAPRINE 10 MG: 10 TABLET, FILM COATED ORAL at 16:37

## 2025-03-04 RX ADMIN — DEXAMETHASONE SODIUM PHOSPHATE 8 MG: 10 INJECTION INTRAMUSCULAR; INTRAVENOUS at 18:15

## 2025-03-04 RX ADMIN — KETOROLAC TROMETHAMINE 30 MG: 30 INJECTION, SOLUTION INTRAMUSCULAR at 16:37

## 2025-03-04 RX ADMIN — OXYCODONE HYDROCHLORIDE AND ACETAMINOPHEN 1 TABLET: 5; 325 TABLET ORAL at 17:27

## 2025-03-04 ASSESSMENT — PAIN SCALES - GENERAL
PAINLEVEL_OUTOF10: 8
PAINLEVEL_OUTOF10: 5
PAINLEVEL_OUTOF10: 8

## 2025-03-04 ASSESSMENT — LIFESTYLE VARIABLES
HOW MANY STANDARD DRINKS CONTAINING ALCOHOL DO YOU HAVE ON A TYPICAL DAY: 1 OR 2
HOW OFTEN DO YOU HAVE A DRINK CONTAINING ALCOHOL: MONTHLY OR LESS

## 2025-03-04 ASSESSMENT — PAIN - FUNCTIONAL ASSESSMENT
PAIN_FUNCTIONAL_ASSESSMENT: 0-10
PAIN_FUNCTIONAL_ASSESSMENT: 0-10

## 2025-03-04 ASSESSMENT — PAIN DESCRIPTION - PAIN TYPE: TYPE: ACUTE PAIN

## 2025-03-04 ASSESSMENT — PAIN DESCRIPTION - LOCATION: LOCATION: BACK

## 2025-03-04 NOTE — ED TRIAGE NOTES
Patient reports lower back pain after bending over today and felt/heard a \"pop\". Patient ambulatory to triage.

## 2025-03-04 NOTE — ED PROVIDER NOTES
Patient: Deborah Zamudio is a 78 y.o. female who presents today with the following Chief Complaint(s):    Chief Complaint   Patient presents with    3 Month Follow-Up     3 month f/u           Diabetes    Hypertension    She is here for a check up and f/u on hypertension, hyperlipidemia, and diabetes, type 2. She takes medication as prescribed, is focused on healthy eating and physical activity. Cleans the house and does dancing exercises. She is active in an organization called [employer] on the move who involves seniors in regular activities during the course of the week. She is always motivated to help friends, family, strangers, etc.     Current Outpatient Medications   Medication Sig Dispense Refill    metFORMIN (GLUCOPHAGE) 500 MG tablet TAKE 1 TABLET BY MOUTH TWICE A DAY BEFORE A MEAL FOR DIABETES 180 tablet 3    losartan-hydroCHLOROthiazide (HYZAAR) 100-12.5 MG per tablet TAKE ONE TABLET BY MOUTH DAILY FOR BLOOD PRESSURE 90 tablet 3    amLODIPine (NORVASC) 5 MG tablet TAKE ONE TABLET BY MOUTH DAILY 90 tablet 3    rosuvastatin (CRESTOR) 20 MG tablet TAKE ONE TABLET BY MOUTH EVERY NIGHT AT BEDTIME FOR HIGH CHOLESTEROL 90 tablet 3    fluticasone (FLONASE) 50 MCG/ACT nasal spray 2 sprays by Each Nostril route daily 1 Bottle 2    aspirin 81 MG EC tablet Take 1 tablet by mouth daily      Cholecalciferol (VITAMIN D) 1000 UNIT CAPS Take  by mouth daily.      glucose blood VI test strips (EXACTECH TEST) strip As needed. 50 strip 5     No current facility-administered medications for this visit.       Patient's past medical history, surgical history, family history, medications,and allergies  were all reviewed and updated as appropriate today.      Review of Systems   Constitutional: Negative.    HENT: Negative.     Eyes: Negative.    Respiratory: Negative.     Cardiovascular:         Hypertension, treated with ARB-diuretic, and Ca blker.    Gastrointestinal: Negative.    Endocrine:        Hyperlipidemia, treated 
Patient: Deborah Zamudio is a 78 y.o. female who presents today with the following Chief Complaint(s):    Chief Complaint   Patient presents with    3 Month Follow-Up     3 month f/u         HPIdoing well.     Current Outpatient Medications   Medication Sig Dispense Refill    metFORMIN (GLUCOPHAGE) 500 MG tablet TAKE 1 TABLET BY MOUTH TWICE A DAY BEFORE A MEAL FOR DIABETES 180 tablet 3    losartan-hydroCHLOROthiazide (HYZAAR) 100-12.5 MG per tablet TAKE ONE TABLET BY MOUTH DAILY FOR BLOOD PRESSURE 90 tablet 3    amLODIPine (NORVASC) 5 MG tablet TAKE ONE TABLET BY MOUTH DAILY 90 tablet 3    rosuvastatin (CRESTOR) 20 MG tablet TAKE ONE TABLET BY MOUTH EVERY NIGHT AT BEDTIME FOR HIGH CHOLESTEROL 90 tablet 3    fluticasone (FLONASE) 50 MCG/ACT nasal spray 2 sprays by Each Nostril route daily 1 Bottle 2    aspirin 81 MG EC tablet Take 1 tablet by mouth daily      Cholecalciferol (VITAMIN D) 1000 UNIT CAPS Take  by mouth daily.      glucose blood VI test strips (EXACTECH TEST) strip As needed. 50 strip 5     No current facility-administered medications for this visit.       Patient's past medical history, surgical history, family history, medications,and allergies  were all reviewed and updated as appropriate today.      Review of Systems      Physical Exam    Vitals:    09/25/24 0854   BP: (!) 159/77   Pulse: 89   SpO2: 95%       Assessment:  Encounter Diagnoses   Name Primary?    Type 2 diabetes mellitus treated without insulin (HCC) Yes    Primary hypertension     Mixed hyperlipidemia     Flu vaccine need        Plan:  1. Type 2 diabetes mellitus treated without insulin (HCC)  ***  - POCT Glucose  - POCT glycosylated hemoglobin (Hb A1C)    2. Primary hypertension  ***    3. Mixed hyperlipidemia  ***    4. Flu vaccine need  ***    
study. Our CT scanners  use one or all of the following: Automated exposure control, adjustment of the  mA and/or kV according to patient size, or iterative reconstruction.    FINDINGS:    No gross fracture. Vertebral body height is intact.    Mild right-sided curvature. Exuberant sharpies fibers extending in the lower  thoracic and upper lumbar spine. Large anterior spurs at L2-L3.    Mild 1 mm retrolisthesis of L5 on S1 with moderate intervertebral disc space  narrowing.    Atheromatous aortoiliac vessels.      Impression    No acute fracture. Degenerative spondyloarthropathy of the lumbar spine..        Electronically signed by Candelario Sandoval         CT LUMBAR SPINE WO CONTRAST   Final Result   No acute fracture. Degenerative spondyloarthropathy of the lumbar spine..            Electronically signed by Candelario Sandoval                   No results for input(s): \"COVID19\" in the last 72 hours.     Voice dictation software was used during the making of this note.  This software is not perfect and grammatical and other typographical errors may be present.  This note has not been completely proofread for errors.     Matthew Lopes III, APRN - CNP  03/05/25 0053

## 2025-03-04 NOTE — DISCHARGE INSTRUCTIONS
EXAMINATION: CT LUMBAR SPINE WO CONTRAST 3/4/2025 4:23 PM     ACCESSION NUMBER: [id number]     COMPARISON: None available     INDICATION: Lumbar pain, felt pop, hx of prev surgery. Pain localize L3-5     TECHNIQUE: Contiguous 2D CT images of the lumbar spine were obtained without  intravenous contrast. Coronal and sagittal reformations were made.     Radiation dose reduction techniques were used for this study. Our CT scanners  use one or all of the following: Automated exposure control, adjustment of the  mA and/or kV according to patient size, or iterative reconstruction.     FINDINGS:    No gross fracture. Vertebral body height is intact.     Mild right-sided curvature. Exuberant sharpies fibers extending in the lower  thoracic and upper lumbar spine. Large anterior spurs at L2-L3.     Mild 1 mm retrolisthesis of L5 on S1 with moderate intervertebral disc space  narrowing.     Atheromatous aortoiliac vessels.     IMPRESSION:  No acute fracture. Degenerative spondyloarthropathy of the lumbar spine..           Electronically signed by Candelario Sandoval      Rest is much as possible  Ice to the low back for 20 minutes each hour while awake for the first 48 hours and 6 times daily as needed for any pain or swelling after the first 48 hours you can also alternate with moist heat  Use the medications as directed as needed for pain  Hydrocodone is narcotic in nature.  Can be habit-forming.  Use it very sparingly.  You cannot operate a vehicle or machinery while taking this medication.  This medication can also cause constipation.  So make sure to use MiraLAX over-the-counter while on the medication to prevent constipation  No heavy lifting  Avoid any bending or stooping  Rest is much as possible   return to the ER if worse  Make sure to follow with your primary care physician this week for recheck exam

## 2025-03-04 NOTE — ED NOTES
Patient mobility status  with no difficulty.     I have reviewed discharge instructions with the patient.  The patient verbalized understanding.    Patient left ED via Discharge Method: ambulatory to Home with Spouse.    Opportunity for questions and clarification provided.     Patient given 2 scripts.

## 2025-03-07 ENCOUNTER — HOSPITAL ENCOUNTER (EMERGENCY)
Age: 60
Discharge: HOME | End: 2025-03-07
Payer: COMMERCIAL

## 2025-03-07 VITALS
RESPIRATION RATE: 18 BRPM | HEART RATE: 60 BPM | WEIGHT: 240 LBS | SYSTOLIC BLOOD PRESSURE: 181 MMHG | OXYGEN SATURATION: 100 % | HEIGHT: 70 IN | TEMPERATURE: 98 F | BODY MASS INDEX: 34.36 KG/M2 | DIASTOLIC BLOOD PRESSURE: 93 MMHG

## 2025-03-07 DIAGNOSIS — M54.50 ACUTE RIGHT-SIDED LOW BACK PAIN WITHOUT SCIATICA: Primary | ICD-10-CM

## 2025-03-07 PROCEDURE — 99284 EMERGENCY DEPT VISIT MOD MDM: CPT

## 2025-03-07 PROCEDURE — 6360000002 HC RX W HCPCS

## 2025-03-07 PROCEDURE — 6370000000 HC RX 637 (ALT 250 FOR IP)

## 2025-03-07 PROCEDURE — 96372 THER/PROPH/DIAG INJ SC/IM: CPT

## 2025-03-07 RX ORDER — PREDNISONE 10 MG/1
50 TABLET ORAL DAILY
Qty: 25 TABLET | Refills: 0 | Status: SHIPPED | OUTPATIENT
Start: 2025-03-07 | End: 2025-03-07 | Stop reason: ALTCHOICE

## 2025-03-07 RX ORDER — MORPHINE SULFATE 10 MG/ML
8 INJECTION INTRAVENOUS ONCE
Status: COMPLETED | OUTPATIENT
Start: 2025-03-07 | End: 2025-03-07

## 2025-03-07 RX ORDER — HYDROCODONE BITARTRATE AND ACETAMINOPHEN 10; 325 MG/1; MG/1
1 TABLET ORAL EVERY 6 HOURS PRN
COMMUNITY

## 2025-03-07 RX ORDER — PREDNISONE 50 MG/1
50 TABLET ORAL DAILY
Status: DISCONTINUED | OUTPATIENT
Start: 2025-03-07 | End: 2025-03-07

## 2025-03-07 RX ORDER — PREDNISONE 50 MG/1
50 TABLET ORAL DAILY
Status: DISCONTINUED | OUTPATIENT
Start: 2025-03-07 | End: 2025-03-07 | Stop reason: HOSPADM

## 2025-03-07 RX ADMIN — MORPHINE SULFATE 8 MG: 10 INJECTION INTRAVENOUS at 07:12

## 2025-03-07 RX ADMIN — PREDNISONE 50 MG: 50 TABLET ORAL at 07:12

## 2025-03-07 ASSESSMENT — PAIN - FUNCTIONAL ASSESSMENT: PAIN_FUNCTIONAL_ASSESSMENT: 0-10

## 2025-03-07 ASSESSMENT — PAIN DESCRIPTION - LOCATION: LOCATION: BACK

## 2025-03-07 ASSESSMENT — PAIN SCALES - GENERAL: PAINLEVEL_OUTOF10: 7
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
## 2025-03-07 NOTE — ED NOTES
Patient mobility status  with no difficulty.     I have reviewed discharge instructions with the patient and spouse.  The patient and spouse verbalized understanding.    Patient left ED via Discharge Method: ambulatory to Home with Spouse.    Opportunity for questions and clarification provided.     Patient given 1 scripts.

## 2025-03-07 NOTE — ED TRIAGE NOTES
Pt reports having back pain since Tuesday. Pt seen here Tuesday and had scan showing bone spur. Pt d/c home followed up with pcp yesterday. Pt with continued pain so here for reevaluation. Pt on prednisone. Pt ran out of norco yesterday given refill from pcp but did not . Denies new trauma or injury. Pt ambulatory with steady gait.

## 2025-03-07 NOTE — DISCHARGE INSTRUCTIONS
You have been seen for your back pain.  We have treated here with an IM injection.  I am also putting on a short course of steroids to take at home.  I want you to fill the prescription that you received from your primary care doctor.  I would like you to use this combination with the muscle relaxers, Tylenol, lidocaine patch to help with your pain.  Continue to follow-up with Ortho in 2 weeks.  Return if you have any worsening symptoms or any new concerns.

## 2025-04-16 ENCOUNTER — HOSPITAL ENCOUNTER (EMERGENCY)
Age: 60
Discharge: HOME | End: 2025-04-16
Attending: EMERGENCY MEDICINE
Payer: COMMERCIAL

## 2025-04-16 VITALS
TEMPERATURE: 98.3 F | OXYGEN SATURATION: 98 % | WEIGHT: 245 LBS | RESPIRATION RATE: 20 BRPM | HEIGHT: 70 IN | DIASTOLIC BLOOD PRESSURE: 113 MMHG | HEART RATE: 75 BPM | SYSTOLIC BLOOD PRESSURE: 159 MMHG | BODY MASS INDEX: 35.07 KG/M2

## 2025-04-16 DIAGNOSIS — J06.9 VIRAL URI: ICD-10-CM

## 2025-04-16 DIAGNOSIS — H65.192 OTHER NON-RECURRENT ACUTE NONSUPPURATIVE OTITIS MEDIA OF LEFT EAR: Primary | ICD-10-CM

## 2025-04-16 LAB
ALBUMIN SERPL-MCNC: 3.3 G/DL (ref 3.5–5)
ALBUMIN/GLOB SERPL: 0.9 (ref 1–1.9)
ALP SERPL-CCNC: 55 U/L (ref 40–129)
ALT SERPL-CCNC: 24 U/L (ref 8–55)
ANION GAP SERPL CALC-SCNC: 8 MMOL/L (ref 7–16)
AST SERPL-CCNC: 24 U/L (ref 15–37)
BASOPHILS # BLD: 0.04 K/UL (ref 0–0.2)
BASOPHILS NFR BLD: 0.6 % (ref 0–2)
BILIRUB SERPL-MCNC: 0.3 MG/DL (ref 0–1.2)
BUN SERPL-MCNC: 20 MG/DL (ref 6–23)
CALCIUM SERPL-MCNC: 9.3 MG/DL (ref 8.8–10.2)
CHLORIDE SERPL-SCNC: 106 MMOL/L (ref 98–107)
CO2 SERPL-SCNC: 24 MMOL/L (ref 20–29)
CREAT SERPL-MCNC: 0.99 MG/DL (ref 0.8–1.3)
DIFFERENTIAL METHOD BLD: ABNORMAL
EOSINOPHIL # BLD: 0.2 K/UL (ref 0–0.8)
EOSINOPHIL NFR BLD: 2.9 % (ref 0.5–7.8)
ERYTHROCYTE [DISTWIDTH] IN BLOOD BY AUTOMATED COUNT: 11.9 % (ref 11.9–14.6)
FLUAV RNA SPEC QL NAA+PROBE: NOT DETECTED
FLUBV RNA SPEC QL NAA+PROBE: NOT DETECTED
GLOBULIN SER CALC-MCNC: 3.8 G/DL (ref 2.3–3.5)
GLUCOSE SERPL-MCNC: 100 MG/DL (ref 70–99)
HCT VFR BLD AUTO: 39.2 % (ref 41.1–50.3)
HGB BLD-MCNC: 12.3 G/DL (ref 13.6–17.2)
IMM GRANULOCYTES # BLD AUTO: 0.02 K/UL (ref 0–0.5)
IMM GRANULOCYTES NFR BLD AUTO: 0.3 % (ref 0–5)
LYMPHOCYTES # BLD: 1.94 K/UL (ref 0.5–4.6)
LYMPHOCYTES NFR BLD: 27.8 % (ref 13–44)
MCH RBC QN AUTO: 28 PG (ref 26.1–32.9)
MCHC RBC AUTO-ENTMCNC: 31.4 G/DL (ref 31.4–35)
MCV RBC AUTO: 89.3 FL (ref 82–102)
MONOCYTES # BLD: 0.86 K/UL (ref 0.1–1.3)
MONOCYTES NFR BLD: 12.3 % (ref 4–12)
NEUTS SEG # BLD: 3.92 K/UL (ref 1.7–8.2)
NEUTS SEG NFR BLD: 56.1 % (ref 43–78)
NRBC # BLD: 0 K/UL (ref 0–0.2)
PLATELET # BLD AUTO: 233 K/UL (ref 150–450)
PMV BLD AUTO: 9.2 FL (ref 9.4–12.3)
POTASSIUM SERPL-SCNC: 4.7 MMOL/L (ref 3.5–5.1)
PROT SERPL-MCNC: 7 G/DL (ref 6.3–8.2)
RBC # BLD AUTO: 4.39 M/UL (ref 4.23–5.6)
SARS-COV-2 RDRP RESP QL NAA+PROBE: NOT DETECTED
SODIUM SERPL-SCNC: 138 MMOL/L (ref 136–145)
SOURCE: NORMAL
WBC # BLD AUTO: 7 K/UL (ref 4.3–11.1)

## 2025-04-16 PROCEDURE — 99283 EMERGENCY DEPT VISIT LOW MDM: CPT

## 2025-04-16 PROCEDURE — 85025 COMPLETE CBC W/AUTO DIFF WBC: CPT

## 2025-04-16 PROCEDURE — 80053 COMPREHEN METABOLIC PANEL: CPT

## 2025-04-16 PROCEDURE — 87636 SARSCOV2 & INF A&B AMP PRB: CPT

## 2025-04-16 RX ORDER — ONDANSETRON 4 MG/1
4 TABLET, ORALLY DISINTEGRATING ORAL 3 TIMES DAILY PRN
Qty: 9 TABLET | Refills: 0 | Status: SHIPPED | OUTPATIENT
Start: 2025-04-16 | End: 2025-04-19

## 2025-04-16 ASSESSMENT — PAIN DESCRIPTION - LOCATION: LOCATION: BACK

## 2025-04-16 ASSESSMENT — PAIN SCALES - GENERAL: PAINLEVEL_OUTOF10: 7

## 2025-04-16 ASSESSMENT — PAIN - FUNCTIONAL ASSESSMENT: PAIN_FUNCTIONAL_ASSESSMENT: 0-10

## 2025-04-16 NOTE — ED PROVIDER NOTES
Emergency Department Provider Note                   PCP:                Og Christina MD               Age: 60 y.o.      Sex: male   Final diagnosis/impression:  1. Other non-recurrent acute nonsuppurative otitis media of left ear    2. Viral URI       Disposition: Discharged    MDM/Clinical Course:  Patient seen by myself at the [facility] emergency department. Patient had signs symptoms and clinical history most consistent with suspected viral illness though does have what appears to be acute otitis media on exam.  As patient has not had any type of labs in quite some time and patient symptoms are fairly nonspecific we will proceed with basic laboratory testing.  CBC shows minimal anemia at 12.3 not felt to be contributory, remainder of CBC is unremarkable.  CMP is unremarkable for acute/emergent abnormality.  COVID/influenza test are negative. Outpatient prescription for Augmentin written.  Recommended OTC supportive care measures, rest, hydration.  Recommended follow-up with primary care provider for further discussion in particular if symptoms not seem to be improving.  I recommended close monitoring of symptoms, low threshold to return as needed.  Patient/family given instructions to return as needed for any questions, concerns or worsening symptoms, particularly those as outlined in the disposition section / discharge section of patient discharge paperwork. Patient/family verbalizes understanding and agreement with ED course/plan in shared medical decision making. Questions answered.    Complexity of Problems Addressed:  1 acute illness with systemic symptoms.    Data Reviewed and Analyzed:    Category 1:   I reviewed external records: provider visit note from PCP.  Reviewed 3/5/2024 primary care provider follow-up visit for chronic medical problems  I ordered each unique test.  I reviewed the results of each unique test.    Category 2:     Category 3: Discussion of management or test

## 2025-04-16 NOTE — DISCHARGE INSTRUCTIONS
Over-the-counter medications that may help for management of cold symptoms include Tylenol/ibuprofen every 6-8 hours as needed for headache, fever/chills, muscle and joint aches.  You may use nasal saline rinse such as simply saline to help clear your sinuses, you may also use Flonase to help with some sinus congestion symptoms.  We also recommend using Mucinex only if you develop chest congestion.  We generally recommend avoiding cough suppressants, you may consider Benadryl one-time dose for cough suppressant and to help sleep at night.  We recommend rest, hydration.    Return as needed for any questions concerns or worsening symptoms particularly recurrent vomiting, inability keep fluids down by mouth increasing/unremitting headache, lethargy, ill appearance.

## 2025-04-30 ENCOUNTER — HOSPITAL ENCOUNTER (EMERGENCY)
Age: 60
Discharge: HOME | End: 2025-04-30
Attending: EMERGENCY MEDICINE
Payer: COMMERCIAL

## 2025-04-30 ENCOUNTER — APPOINTMENT (OUTPATIENT)
Dept: GENERAL RADIOLOGY | Age: 60
End: 2025-04-30
Payer: COMMERCIAL

## 2025-04-30 VITALS
RESPIRATION RATE: 23 BRPM | HEIGHT: 70 IN | DIASTOLIC BLOOD PRESSURE: 76 MMHG | BODY MASS INDEX: 34.36 KG/M2 | SYSTOLIC BLOOD PRESSURE: 135 MMHG | OXYGEN SATURATION: 100 % | HEART RATE: 58 BPM | TEMPERATURE: 97.7 F | WEIGHT: 240 LBS

## 2025-04-30 DIAGNOSIS — R07.9 CHEST PAIN OF UNCERTAIN ETIOLOGY: Primary | ICD-10-CM

## 2025-04-30 LAB
ALBUMIN SERPL-MCNC: 3.5 G/DL (ref 3.5–5)
ALBUMIN/GLOB SERPL: 1.2 (ref 1–1.9)
ALP SERPL-CCNC: 58 U/L (ref 40–129)
ALT SERPL-CCNC: 21 U/L (ref 8–55)
ANION GAP SERPL CALC-SCNC: 9 MMOL/L (ref 7–16)
AST SERPL-CCNC: 23 U/L (ref 15–37)
BASOPHILS # BLD: 0.04 K/UL (ref 0–0.2)
BASOPHILS NFR BLD: 0.6 % (ref 0–2)
BILIRUB SERPL-MCNC: 0.3 MG/DL (ref 0–1.2)
BUN SERPL-MCNC: 21 MG/DL (ref 6–23)
CALCIUM SERPL-MCNC: 9.4 MG/DL (ref 8.8–10.2)
CHLORIDE SERPL-SCNC: 105 MMOL/L (ref 98–107)
CO2 SERPL-SCNC: 26 MMOL/L (ref 20–29)
CREAT SERPL-MCNC: 0.99 MG/DL (ref 0.8–1.3)
DIFFERENTIAL METHOD BLD: ABNORMAL
EKG ATRIAL RATE: 66 BPM
EKG DIAGNOSIS: NORMAL
EKG P AXIS: 65 DEGREES
EKG P-R INTERVAL: 148 MS
EKG Q-T INTERVAL: 371 MS
EKG QRS DURATION: 88 MS
EKG QTC CALCULATION (BAZETT): 389 MS
EKG R AXIS: 62 DEGREES
EKG T AXIS: 75 DEGREES
EKG VENTRICULAR RATE: 66 BPM
EOSINOPHIL # BLD: 0.23 K/UL (ref 0–0.8)
EOSINOPHIL NFR BLD: 3.4 % (ref 0.5–7.8)
ERYTHROCYTE [DISTWIDTH] IN BLOOD BY AUTOMATED COUNT: 12 % (ref 11.9–14.6)
GLOBULIN SER CALC-MCNC: 3 G/DL (ref 2.3–3.5)
GLUCOSE SERPL-MCNC: 93 MG/DL (ref 70–99)
HCT VFR BLD AUTO: 37.5 % (ref 41.1–50.3)
HGB BLD-MCNC: 12.1 G/DL (ref 13.6–17.2)
IMM GRANULOCYTES # BLD AUTO: 0.03 K/UL (ref 0–0.5)
IMM GRANULOCYTES NFR BLD AUTO: 0.4 % (ref 0–5)
LYMPHOCYTES # BLD: 1.95 K/UL (ref 0.5–4.6)
LYMPHOCYTES NFR BLD: 28.5 % (ref 13–44)
MCH RBC QN AUTO: 28.8 PG (ref 26.1–32.9)
MCHC RBC AUTO-ENTMCNC: 32.3 G/DL (ref 31.4–35)
MCV RBC AUTO: 89.3 FL (ref 82–102)
MONOCYTES # BLD: 0.75 K/UL (ref 0.1–1.3)
MONOCYTES NFR BLD: 11 % (ref 4–12)
NEUTS SEG # BLD: 3.84 K/UL (ref 1.7–8.2)
NEUTS SEG NFR BLD: 56.1 % (ref 43–78)
NRBC # BLD: 0 K/UL (ref 0–0.2)
PLATELET # BLD AUTO: 241 K/UL (ref 150–450)
PMV BLD AUTO: 9.1 FL (ref 9.4–12.3)
POTASSIUM SERPL-SCNC: 4.9 MMOL/L (ref 3.5–5.1)
PROT SERPL-MCNC: 6.5 G/DL (ref 6.3–8.2)
RBC # BLD AUTO: 4.2 M/UL (ref 4.23–5.6)
SODIUM SERPL-SCNC: 140 MMOL/L (ref 136–145)
TROPONIN T SERPL HS-MCNC: 18.6 NG/L (ref 0–22)
TROPONIN T SERPL HS-MCNC: 20.1 NG/L (ref 0–22)
WBC # BLD AUTO: 6.8 K/UL (ref 4.3–11.1)

## 2025-04-30 PROCEDURE — 85025 COMPLETE CBC W/AUTO DIFF WBC: CPT

## 2025-04-30 PROCEDURE — 80053 COMPREHEN METABOLIC PANEL: CPT

## 2025-04-30 PROCEDURE — 93005 ELECTROCARDIOGRAM TRACING: CPT | Performed by: STUDENT IN AN ORGANIZED HEALTH CARE EDUCATION/TRAINING PROGRAM

## 2025-04-30 PROCEDURE — 84484 ASSAY OF TROPONIN QUANT: CPT

## 2025-04-30 PROCEDURE — 6370000000 HC RX 637 (ALT 250 FOR IP): Performed by: EMERGENCY MEDICINE

## 2025-04-30 PROCEDURE — 93010 ELECTROCARDIOGRAM REPORT: CPT | Performed by: INTERNAL MEDICINE

## 2025-04-30 PROCEDURE — 99285 EMERGENCY DEPT VISIT HI MDM: CPT

## 2025-04-30 PROCEDURE — 71045 X-RAY EXAM CHEST 1 VIEW: CPT

## 2025-04-30 RX ORDER — ASPIRIN 81 MG/1
81 TABLET ORAL DAILY
Qty: 30 TABLET | Refills: 0 | Status: SHIPPED | OUTPATIENT
Start: 2025-04-30

## 2025-04-30 RX ORDER — ASPIRIN 325 MG
325 TABLET ORAL
Status: COMPLETED | OUTPATIENT
Start: 2025-04-30 | End: 2025-04-30

## 2025-04-30 RX ADMIN — ASPIRIN 325 MG: 325 TABLET, FILM COATED ORAL at 07:43

## 2025-04-30 ASSESSMENT — PAIN DESCRIPTION - LOCATION: LOCATION: CHEST

## 2025-04-30 ASSESSMENT — PAIN - FUNCTIONAL ASSESSMENT: PAIN_FUNCTIONAL_ASSESSMENT: 0-10

## 2025-04-30 ASSESSMENT — PAIN SCALES - GENERAL: PAINLEVEL_OUTOF10: 5

## 2025-04-30 NOTE — DISCHARGE INSTRUCTIONS
As we did, overall, you are low risk for heart disease but not no risk.  Your risk factors include your body weight and high blood pressure.  As result it is very important that you follow-up with cardiology as you will likely need a stress test to determine if you have any heart disease.  In the interim if you have any return of chest pain you should return the emergency department immediately for reevaluation.  You should be contacted by the cardiology office to schedule your appointment.
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
## 2025-04-30 NOTE — ED TRIAGE NOTES
Pt ambulatory to triage. C/o intermittent chest pain that started yesterday morning. Denies cardiac hx.

## (undated) DEVICE — SYR 5ML 1/5 GRAD LL NSAF LF --

## (undated) DEVICE — CANNULA NSL ORAL AD FOR CAPNOFLEX CO2 O2 AIRLFE

## (undated) DEVICE — SYR 3ML LL TIP 1/10ML GRAD --

## (undated) DEVICE — CONNECTOR TBNG OD5-7MM O2 END DISP

## (undated) DEVICE — NDL PRT INJ NSAF BLNT 18GX1.5 --

## (undated) DEVICE — KENDALL RADIOLUCENT FOAM MONITORING ELECTRODE RECTANGULAR SHAPE: Brand: KENDALL